# Patient Record
Sex: MALE | Race: WHITE | Employment: FULL TIME | URBAN - METROPOLITAN AREA
[De-identification: names, ages, dates, MRNs, and addresses within clinical notes are randomized per-mention and may not be internally consistent; named-entity substitution may affect disease eponyms.]

---

## 2018-12-26 ENCOUNTER — HOSPITAL ENCOUNTER (EMERGENCY)
Age: 28
Discharge: HOME | End: 2018-12-26
Attending: EMERGENCY MEDICINE
Payer: COMMERCIAL

## 2018-12-26 VITALS
WEIGHT: 190 LBS | HEART RATE: 77 BPM | SYSTOLIC BLOOD PRESSURE: 145 MMHG | OXYGEN SATURATION: 100 % | RESPIRATION RATE: 16 BRPM | DIASTOLIC BLOOD PRESSURE: 91 MMHG | TEMPERATURE: 98.2 F | HEIGHT: 72 IN | BODY MASS INDEX: 25.73 KG/M2

## 2018-12-26 DIAGNOSIS — R19.7 NAUSEA VOMITING AND DIARRHEA: Primary | ICD-10-CM

## 2018-12-26 DIAGNOSIS — R11.2 NAUSEA VOMITING AND DIARRHEA: Primary | ICD-10-CM

## 2018-12-26 PROCEDURE — 6360000002 HC RX W HCPCS: Performed by: EMERGENCY MEDICINE

## 2018-12-26 PROCEDURE — 96365 THER/PROPH/DIAG IV INF INIT: CPT

## 2018-12-26 PROCEDURE — 2580000003 HC RX 258: Performed by: EMERGENCY MEDICINE

## 2018-12-26 PROCEDURE — 99283 EMERGENCY DEPT VISIT LOW MDM: CPT

## 2018-12-26 PROCEDURE — 96375 TX/PRO/DX INJ NEW DRUG ADDON: CPT

## 2018-12-26 RX ORDER — SODIUM CHLORIDE, SODIUM LACTATE, POTASSIUM CHLORIDE, AND CALCIUM CHLORIDE .6; .31; .03; .02 G/100ML; G/100ML; G/100ML; G/100ML
1000 INJECTION, SOLUTION INTRAVENOUS ONCE
Status: COMPLETED | OUTPATIENT
Start: 2018-12-26 | End: 2018-12-26

## 2018-12-26 RX ORDER — ONDANSETRON 2 MG/ML
4 INJECTION INTRAMUSCULAR; INTRAVENOUS ONCE
Status: COMPLETED | OUTPATIENT
Start: 2018-12-26 | End: 2018-12-26

## 2018-12-26 RX ORDER — ONDANSETRON 4 MG/1
4 TABLET, ORALLY DISINTEGRATING ORAL EVERY 8 HOURS PRN
Qty: 12 TABLET | Refills: 0 | Status: SHIPPED | OUTPATIENT
Start: 2018-12-26

## 2018-12-26 RX ORDER — OMEPRAZOLE 20 MG/1
TABLET, DELAYED RELEASE ORAL
COMMUNITY
End: 2018-12-26 | Stop reason: SDUPTHER

## 2018-12-26 RX ORDER — CITALOPRAM 40 MG/1
60 TABLET ORAL
COMMUNITY
Start: 2018-08-24

## 2018-12-26 RX ADMIN — SODIUM CHLORIDE, POTASSIUM CHLORIDE, SODIUM LACTATE AND CALCIUM CHLORIDE 1000 ML: 600; 310; 30; 20 INJECTION, SOLUTION INTRAVENOUS at 04:51

## 2018-12-26 RX ADMIN — ONDANSETRON 4 MG: 2 INJECTION INTRAMUSCULAR; INTRAVENOUS at 04:51

## 2018-12-26 NOTE — ED NOTES
Pt given ice chips. Pt has had no further bouts of emesis since fluids and medication.       Goldie Enamorado RN  12/26/18 0800

## 2024-04-02 ENCOUNTER — HOSPITAL ENCOUNTER (EMERGENCY)
Age: 34
Discharge: HOME | End: 2024-04-02
Attending: EMERGENCY MEDICINE
Payer: COMMERCIAL

## 2024-04-02 VITALS
OXYGEN SATURATION: 98 % | HEIGHT: 72 IN | SYSTOLIC BLOOD PRESSURE: 143 MMHG | WEIGHT: 200 LBS | HEART RATE: 78 BPM | BODY MASS INDEX: 27.09 KG/M2 | TEMPERATURE: 98.4 F | RESPIRATION RATE: 20 BRPM | DIASTOLIC BLOOD PRESSURE: 76 MMHG

## 2024-04-02 DIAGNOSIS — F41.9 ANXIETY: Primary | ICD-10-CM

## 2024-04-02 DIAGNOSIS — F41.0 PANIC ATTACK: ICD-10-CM

## 2024-04-02 PROCEDURE — 99283 EMERGENCY DEPT VISIT LOW MDM: CPT

## 2024-04-02 RX ORDER — LORAZEPAM 0.5 MG/1
0.5 TABLET ORAL EVERY 8 HOURS PRN
Qty: 6 TABLET | Refills: 0 | Status: SHIPPED | OUTPATIENT
Start: 2024-04-02 | End: 2024-04-04

## 2024-04-02 ASSESSMENT — LIFESTYLE VARIABLES
HOW MANY STANDARD DRINKS CONTAINING ALCOHOL DO YOU HAVE ON A TYPICAL DAY: PATIENT DOES NOT DRINK
HOW OFTEN DO YOU HAVE A DRINK CONTAINING ALCOHOL: NEVER

## 2024-04-02 NOTE — DISCHARGE INSTRUCTIONS
Please go to the [facility] [place] center.  Follow up with the PCP as well.  Return to the ED if you develop worsening anxiety, chest pain, shortness of breath, suicidal ideations, homicidal ideations, hallucinations or any other concerning symptoms. Do not operate machinery or drink alcohol while taking ativan. Can cause drowsiness.

## 2024-04-02 NOTE — ED PROVIDER NOTES
eMERGENCY dEPARTMENT eNCOUnter   Independent Attestation     Pt Name: Daniel Alves  MRN: [id number]  Birthdate 1990  Date of evaluation: 4/2/24     Daniel Alves is a 33 y.o. male with CC: Anxiety      Based on the medical record the care appears appropriate.  I was personally available for consultation in the Emergency Department.    Kenn Jules DO  Attending Emergency Physician                 Kenn Jules DO  04/02/24 1511    
          ALLERGIES     has No Known Allergies.  FAMILY HISTORY     has no family status information on file.      SOCIAL HISTORY       Social History     Tobacco Use    Smoking status: Former     Types: Cigarettes    Smokeless tobacco: Current     Types: Chew   Substance Use Topics    Alcohol use: No    Drug use: No     PHYSICAL EXAM     INITIAL VITALS: BP (!) 143/76   Pulse 78   Temp 98.4 °F (36.9 °C) (Oral)   Resp 20   Ht 1.829 m (6')   Wt 90.7 kg (200 lb)   SpO2 98%   BMI 27.12 kg/m²    Physical Exam  Vitals and nursing note reviewed.   Constitutional:       General: He is awake.      Appearance: Normal appearance. He is well-developed, well-groomed and normal weight.   HENT:      Head: Normocephalic and atraumatic.   Eyes:      Extraocular Movements: Extraocular movements intact.      Conjunctiva/sclera: Conjunctivae normal.      Pupils: Pupils are equal, round, and reactive to light.   Cardiovascular:      Rate and Rhythm: Normal rate and regular rhythm.      Pulses: Normal pulses.      Heart sounds: Normal heart sounds, S1 normal and S2 normal.   Pulmonary:      Effort: Pulmonary effort is normal. No respiratory distress.      Breath sounds: Normal breath sounds and air entry. No stridor. No decreased breath sounds, wheezing, rhonchi or rales.   Abdominal:      General: Abdomen is flat.      Tenderness: There is no abdominal tenderness. There is no guarding or rebound.   Musculoskeletal:      Cervical back: Normal range of motion.   Neurological:      General: No focal deficit present.      Mental Status: He is alert and oriented to person, place, and time. Mental status is at baseline.   Psychiatric:         Attention and Perception: Attention and perception normal. He is attentive. He does not perceive auditory or visual hallucinations.         Mood and Affect: Mood is anxious. Mood is not depressed or elated. Affect is not labile, blunt, flat, angry, tearful or inappropriate.         Speech: Speech

## 2024-04-02 NOTE — ED TRIAGE NOTES
Mode of arrival (squad #, walk in, police, etc) : walk-in        Chief complaint(s): anxiety        Arrival Note (brief scenario, treatment PTA, etc).: Pt reports anxietyt and on meds x 14 years. Pt PCP referred him to psychiatrist and pt can not get in until the 11th. Pt reports increased anxiety.         C= \"Have you ever felt that you should Cut down on your drinking?\"  No  A= \"Have people Annoyed you by criticizing your drinking?\"  No  G= \"Have you ever felt bad or Guilty about your drinking?\"  No  E= \"Have you ever had a drink as an Eye-opener first thing in the morning to steady your nerves or to help a hangover?\"  No      Deferred []      Reason for deferring: N/A    *If yes to two or more: probable alcohol abuse.*

## 2024-04-02 NOTE — ED NOTES
Daniel Alves is a 33 y.o. male who presents to the ED for increase anxiety, panic attacks and difficulty sleeping.Patient states he has been on anxiety medications for the last 14 years that is managed by his PCP. Patient states his anxiety and panic attacks began to worsen and slowly become more severe. Patient states he was at work today and was having a panic attack that he couldn't manage in spite of him trying to distract himself. Patient states his PCP started him on Buspar about two weeks ago and he states this medication made his symptoms worse. Patient states he stopped taking this two days ago.    Patient states at night he is having significant racing thoughts to the point where he starts questioning reality and his own \"physical being.\" Patient states he is having difficulty explaining the irrational thoughts he has at night, but states he has never had them before.     Patient displays no abnormal movements, speech rate, or tone. Articulations were within normal limits. Patients memory was intact. Thought form was linear. Patient denies obsessions or compulsions.  Patient denies homicidal ideation. Patient denies auditory and visual hallucinations.      Patient states he has a first appointment with his psychiatrist on 4/11/24, with Dr. Narvaez at [facility]. Patient states he doesn't feel like he can wait that long    Patient displays no abnormal movements, speech rate, or tone. Articulations were within normal limits. Patients memory was intact. Thought form was linear. Patient denies obsessions or compulsions.  Patient denies homicidal ideation. Patient denies auditory and visual hallucinations. Patient denies suicidal ideation.      Patient does not want hospital level inpatient hospitalization at this time, and wanting more urgent resources. Writer provided information and education on [place] and [facility], as well as information [facility]. Patient is agreeable